# Patient Record
(demographics unavailable — no encounter records)

---

## 2025-03-20 NOTE — DISCUSSION/SUMMARY
[FreeTextEntry1] : 59 yo lady PMHx of mod MR, mild TR, HTN, HLD, breast cancer s/p mastectomy and chemoradiation, and endometrial carcinoma s/p radiation   Assessment 1. Palpitations Non cardiac - 7d outpatient ECG monitor normal Possibly panic attack or inappropriate sinus tachycardia 2. HTN 3. HLD 4. Moderate MR and mild TR  Plan 1. Losartan 100mg PO QD for HTN + afterload reduction for MR 2. Metoprolol succinate 25mg PO QD for HTN 3. Propranolol 20mg PO PRN 4. Statin at current dose 5. Will need close surveillance of her MR, will repeat TTE Aug/2025 6. RTC AUGUST/2025  During non face-to-face time, I reviewed relevant portions of the patient's medical record. During face-to-face time, I took a relevant history and examined the patient. I also explained differential diagnoses, relevant cardiac diagnoses, workup, and management plan, which required a moderate level of medical decision making. I answered all questions related to the patient's medical conditions.   Krish Zavaleta M.D. Attending Cardiologist [facility]

## 2025-03-20 NOTE — HISTORY OF PRESENT ILLNESS
[FreeTextEntry1] : 59 yo lady PMHx of mod MR, mild TR, HTN, HLD, breast cancer s/p mastectomy and chemoradiation, and endometrial carcinoma s/p radiation   03/19/25 ROS much less frequent palpitations 10/03/24 ROS continued palpitations, no other sx 08/15/24 ROS w/o repeat palpitations episode since the last clinic visit.  07/25/24 ROS + sudden onset generalized weakness associated with palpitations (-110s) early July. Few days later, another palpitations episode -> EMS -> EKG sinus tachycardia, did not go to the ED

## 2025-03-20 NOTE — HISTORY OF PRESENT ILLNESS
[FreeTextEntry1] : 61 yo lady PMHx of mod MR, mild TR, HTN, HLD, breast cancer s/p mastectomy and chemoradiation, and endometrial carcinoma s/p radiation   03/19/25 ROS much less frequent palpitations 10/03/24 ROS continued palpitations, no other sx 08/15/24 ROS w/o repeat palpitations episode since the last clinic visit.  07/25/24 ROS + sudden onset generalized weakness associated with palpitations (-110s) early July. Few days later, another palpitations episode -> EMS -> EKG sinus tachycardia, did not go to the ED

## 2025-03-20 NOTE — DISCUSSION/SUMMARY
[FreeTextEntry1] : 59 yo lady PMHx of mod MR, mild TR, HTN, HLD, breast cancer s/p mastectomy and chemoradiation, and endometrial carcinoma s/p radiation   Assessment 1. Palpitations Non cardiac - 7d outpatient ECG monitor normal Possibly panic attack or inappropriate sinus tachycardia 2. HTN 3. HLD 4. Moderate MR and mild TR  Plan 1. Losartan 100mg PO QD for HTN + afterload reduction for MR 2. Metoprolol succinate 25mg PO QD for HTN 3. Propranolol 20mg PO PRN 4. Statin at current dose 5. Will need close surveillance of her MR, will repeat TTE Aug/2025 6. RTC AUGUST/2025  During non face-to-face time, I reviewed relevant portions of the patient's medical record. During face-to-face time, I took a relevant history and examined the patient. I also explained differential diagnoses, relevant cardiac diagnoses, workup, and management plan, which required a moderate level of medical decision making. I answered all questions related to the patient's medical conditions.   Krish Zavaleta M.D. Attending Cardiologist [facility]

## 2025-05-08 NOTE — HISTORY OF PRESENT ILLNESS
[FreeTextEntry1] : 62 yo lady PMHx of mod MR, mild TR, HTN, HLD, breast cancer s/p mastectomy and chemoradiation, and endometrial carcinoma s/p radiation   05/08/25 ROS negative 03/19/25 ROS much less frequent palpitations 10/03/24 ROS continued palpitations, no other sx 08/15/24 ROS w/o repeat palpitations episode since the last clinic visit.  07/25/24 ROS + sudden onset generalized weakness associated with palpitations (-110s) early July. Few days later, another palpitations episode -> EMS -> EKG sinus tachycardia, did not go to the ED

## 2025-05-08 NOTE — DISCUSSION/SUMMARY
[FreeTextEntry1] : 62 yo lady PMHx of mod MR, mild TR, HTN, HLD, breast cancer s/p mastectomy and chemoradiation, and endometrial carcinoma s/p radiation   Assessment 1. Palpitations Non cardiac - 7d outpatient ECG monitor normal Possibly panic attack or inappropriate sinus tachycardia 2. HTN 3. HLD 4. Moderate MR and mild TR  Plan 1. Losartan 100mg PO QD for HTN + afterload reduction for MR 2. Metoprolol succinate 25mg PO QD for HTN 3. Propranolol 20mg PO PRN 4. Statin at current dose 5. Will need close surveillance of her MR annually (TTE)  During non face-to-face time, I reviewed relevant portions of the patient's medical record. During face-to-face time, I took a relevant history and examined the patient. I also explained differential diagnoses, relevant cardiac diagnoses, workup, and management plan, which required a moderate level of medical decision making. I answered all questions related to the patient's medical conditions.   Krish Zavaleta M.D. Attending Cardiologist [facility]